# Patient Record
Sex: MALE | Race: WHITE | Employment: OTHER | URBAN - METROPOLITAN AREA
[De-identification: names, ages, dates, MRNs, and addresses within clinical notes are randomized per-mention and may not be internally consistent; named-entity substitution may affect disease eponyms.]

---

## 2020-09-02 ENCOUNTER — VIRTUAL VISIT (OUTPATIENT)
Dept: INTERNAL MEDICINE CLINIC | Age: 60
End: 2020-09-02
Payer: COMMERCIAL

## 2020-09-02 VITALS — SYSTOLIC BLOOD PRESSURE: 130 MMHG | DIASTOLIC BLOOD PRESSURE: 78 MMHG

## 2020-09-02 PROBLEM — N52.8 OTHER MALE ERECTILE DYSFUNCTION: Status: ACTIVE | Noted: 2020-09-02

## 2020-09-02 PROBLEM — I10 ESSENTIAL HYPERTENSION: Status: ACTIVE | Noted: 2020-09-02

## 2020-09-02 PROBLEM — F11.11 HISTORY OF OPIOID ABUSE (HCC): Status: ACTIVE | Noted: 2020-09-02

## 2020-09-02 PROBLEM — R35.1 BPH ASSOCIATED WITH NOCTURIA: Status: ACTIVE | Noted: 2020-09-02

## 2020-09-02 PROBLEM — N40.1 BPH ASSOCIATED WITH NOCTURIA: Status: ACTIVE | Noted: 2020-09-02

## 2020-09-02 PROCEDURE — 99203 OFFICE O/P NEW LOW 30 MIN: CPT | Performed by: NURSE PRACTITIONER

## 2020-09-02 PROCEDURE — 3017F COLORECTAL CA SCREEN DOC REV: CPT | Performed by: NURSE PRACTITIONER

## 2020-09-02 PROCEDURE — G8427 DOCREV CUR MEDS BY ELIG CLIN: HCPCS | Performed by: NURSE PRACTITIONER

## 2020-09-02 RX ORDER — LISINOPRIL 20 MG/1
20 TABLET ORAL DAILY
COMMUNITY
End: 2020-09-02 | Stop reason: SDUPTHER

## 2020-09-02 RX ORDER — SILDENAFIL 50 MG/1
50 TABLET, FILM COATED ORAL DAILY PRN
Qty: 10 TABLET | Refills: 2 | Status: SHIPPED | OUTPATIENT
Start: 2020-09-02 | End: 2020-11-06 | Stop reason: SDUPTHER

## 2020-09-02 RX ORDER — LISINOPRIL 20 MG/1
20 TABLET ORAL DAILY
Qty: 30 TABLET | Refills: 3 | Status: SHIPPED | OUTPATIENT
Start: 2020-09-02 | End: 2021-08-30

## 2020-09-02 RX ORDER — TAMSULOSIN HYDROCHLORIDE 0.4 MG/1
0.4 CAPSULE ORAL DAILY
Qty: 30 CAPSULE | Refills: 3 | Status: SHIPPED | OUTPATIENT
Start: 2020-09-02 | End: 2022-07-07 | Stop reason: ALTCHOICE

## 2020-09-02 ASSESSMENT — ENCOUNTER SYMPTOMS
BLOOD IN STOOL: 0
GASTROINTESTINAL NEGATIVE: 1
RESPIRATORY NEGATIVE: 1

## 2020-09-02 ASSESSMENT — PATIENT HEALTH QUESTIONNAIRE - PHQ9
1. LITTLE INTEREST OR PLEASURE IN DOING THINGS: 0
SUM OF ALL RESPONSES TO PHQ QUESTIONS 1-9: 0
SUM OF ALL RESPONSES TO PHQ QUESTIONS 1-9: 0
SUM OF ALL RESPONSES TO PHQ9 QUESTIONS 1 & 2: 0
2. FEELING DOWN, DEPRESSED OR HOPELESS: 0

## 2020-09-02 NOTE — PROGRESS NOTES
2020    TELEHEALTH EVALUATION -- Audio/Visual (During PKRSI-81 public health emergency)    HPI:    Sole Song (:  1960) has requested an audio/video evaluation for the following concern(s):    The patient presents to the office to establish with a new primary care provider. Last PCP was Dr. Francia Muniz about 7 years ago. He was recently released from prison where he was getting health care. He initially denied any chronic medical conditions but later admitted that he was being treated for high blood pressure while in prison. He has been taking Lasix and lisinopril \"as needed. \"    His main concern today is urinary frequency at night. He also reports weak urinary stream.  His symptoms have been present for about 5 months. There is no associated dysuria, hematuria, or hematospermia. He has history of erectile dysfunction for about 1 year. No past treatments. BP: 130/78    No tobacco use. No alcohol use. No current illicit drug abuse. Admits to history of pain medication and heroin abuse. Quit 3 years ago. Has remained sober. Review of Systems   Constitutional: Negative. HENT: Negative. Respiratory: Negative. Cardiovascular: Negative. Negative for chest pain. Gastrointestinal: Negative. Negative for blood in stool. Genitourinary: Positive for difficulty urinating and frequency. Negative for dysuria and hematuria. Musculoskeletal: Negative. Neurological: Negative. Psychiatric/Behavioral: Negative. All other systems reviewed and are negative. Prior to Visit Medications    Medication Sig Taking? Authorizing Provider   lisinopril (PRINIVIL;ZESTRIL) 20 MG tablet Take 20 mg by mouth daily Yes Historical Provider, MD       Social History     Tobacco Use    Smoking status: Never Smoker    Smokeless tobacco: Never Used   Substance Use Topics    Alcohol use: No     Comment: Never heavy user.     Drug use: No     Comment: Hx of MJ experimentation when young.            PHYSICAL EXAMINATION:  [ INSTRUCTIONS:  \"[x]\" Indicates a positive item  \"[]\" Indicates a negative item  -- DELETE ALL ITEMS NOT EXAMINED]  Vital Signs: (As obtained by patient/caregiver or practitioner observation)    Blood pressure-  Heart rate-    Respiratory rate-    Temperature-  Pulse oximetry-     Constitutional: [x] Appears well-developed and well-nourished [x] No apparent distress      [] Abnormal-   Mental status  [x] Alert and awake  [x] Oriented to person/place/time [x]Able to follow commands      Eyes:  EOM    [x]  Normal  [] Abnormal-  Sclera  [x]  Normal  [] Abnormal -         Discharge [x]  None visible  [] Abnormal -    HENT:   [x] Normocephalic, atraumatic. [] Abnormal   [x] Mouth/Throat: Mucous membranes are moist.     External Ears [x] Normal  [] Abnormal-     Neck: [x] No visualized mass     Pulmonary/Chest: [x] Respiratory effort normal.  [x] No visualized signs of difficulty breathing or respiratory distress        [] Abnormal-      Musculoskeletal:   [x] Normal gait with no signs of ataxia         [x] Normal range of motion of neck        [] Abnormal-       Neurological:        [x] No Facial Asymmetry (Cranial nerve 7 motor function) (limited exam to video visit)          [x] No gaze palsy        [] Abnormal-         Skin:        [x] No significant exanthematous lesions or discoloration noted on facial skin         [] Abnormal-            Psychiatric:       [x] Normal Affect [x] No Hallucinations        [] Abnormal-     Other pertinent observable physical exam findings-       ASSESSMENT/PLAN:  1. Encounter to establish care  -Discussed history and physical  -EMR information reviewed  -Patient will bring in his blood work from prison soon for review    2. Essential hypertension  -Reports he has been using lisinopril and Lasix as needed  -Encouraged him to take medication on a regular basis.  -He reports mostly controlled blood pressure.   We will continue lisinopril for now were located at their individual homes. --EDNA Lopez - CNP on 9/2/2020 at 11:50 AM    An electronic signature was used to authenticate this note.

## 2020-11-05 ENCOUNTER — TELEPHONE (OUTPATIENT)
Dept: INTERNAL MEDICINE CLINIC | Age: 60
End: 2020-11-05

## 2020-11-05 NOTE — TELEPHONE ENCOUNTER
Only seen once for NP visit virtually. He was supposed to bring in copies of blood work from alf, I do not see them. Did he bring them in? I can not treat testosterone without seeing him and checking labs.

## 2020-11-05 NOTE — TELEPHONE ENCOUNTER
----- Message from Rosy Butler sent at 11/5/2020  8:16 AM EST -----  Subject: Message to Provider    QUESTIONS  Information for Provider? Pt is requesting testosterone cream   to help with low levels   ---------------------------------------------------------------------------  --------------  CALL BACK INFO  What is the best way for the office to contact you? OK to leave message on   voicemail  Preferred Call Back Phone Number? [phone]  ---------------------------------------------------------------------------  --------------  SCRIPT ANSWERS  Relationship to Patient?  Self

## 2020-11-06 ENCOUNTER — OFFICE VISIT (OUTPATIENT)
Dept: INTERNAL MEDICINE CLINIC | Age: 60
End: 2020-11-06
Payer: COMMERCIAL

## 2020-11-06 VITALS
HEIGHT: 71 IN | SYSTOLIC BLOOD PRESSURE: 110 MMHG | HEART RATE: 46 BPM | TEMPERATURE: 98 F | WEIGHT: 308 LBS | DIASTOLIC BLOOD PRESSURE: 82 MMHG | BODY MASS INDEX: 43.12 KG/M2

## 2020-11-06 PROCEDURE — 90715 TDAP VACCINE 7 YRS/> IM: CPT | Performed by: NURSE PRACTITIONER

## 2020-11-06 PROCEDURE — G8484 FLU IMMUNIZE NO ADMIN: HCPCS | Performed by: NURSE PRACTITIONER

## 2020-11-06 PROCEDURE — 99396 PREV VISIT EST AGE 40-64: CPT | Performed by: NURSE PRACTITIONER

## 2020-11-06 RX ORDER — SILDENAFIL 50 MG/1
50 TABLET, FILM COATED ORAL DAILY PRN
Qty: 10 TABLET | Refills: 2 | Status: SHIPPED | OUTPATIENT
Start: 2020-11-06 | End: 2022-07-07 | Stop reason: SDUPTHER

## 2020-11-06 ASSESSMENT — ENCOUNTER SYMPTOMS
RESPIRATORY NEGATIVE: 1
GASTROINTESTINAL NEGATIVE: 1

## 2020-11-06 NOTE — PROGRESS NOTES
SUBJECTIVE:    Patient ID: Sandra Mosqueda is a 56 y.o. male. CC: Annual examination, testosterone concerns, erectile dysfunction, hypertension    HPI: The patient presents the office today for annual physical examination and follow-up of chronic medical conditions. He also has concerns about his health which are not new. Patient reports a history of low testosterone. He is interested in getting this treated but has not had a testosterone level checked recently. Low testosterone is manifesting and fatigue and intermittent libido, intermittent erectile dysfunction. At his new patient appointment which was conducted virtually, the patient requested assistance for erectile dysfunction. He was given a prescription for Viagra but was quoted $300 price by his pharmacy. He was unaware that he could go to [facility] and get the medication cheaper with good Rx. He has a history of hypertension. He denies any chest pain or shortness of breath. He is taking lisinopril as directed. He denies any side effects. Most recent health care was provided while he was incarcerated. He brings a disc with his labs on it but unfortunately due to security issues we are not able to access disks here in the office. He is agreeable to returning home with his disc and printing out the labs and returning them to the office at his next appointment. Patient discussed his history of opiate addiction. He reports a remote history of back injury and was placed on 5 mg Percocet by his back specialist.  He was on these medications for a number of years when they were increased to 10 mg Percocet tablets. He was again taking these medications for years when his provider told him that he can no longer provide him the medication. The patient states he almost immediately went into withdrawal and sought medication from the streets. He began using small amounts of heroin with escalated to larger amounts of heroin.   This then led to him committing crime to pay for his habit which is why he was incarcerated. He reports he has been clean from drug abuse for a number of years. He has mandatory drug counseling as part of his probation. Past Medical History:   Diagnosis Date    Back injury     2009 injury R upper back from fall    Benign essential hypertension      and LVH on echo    Dysmetabolic syndrome X     GERD (gastroesophageal reflux disease)     HDL lipoprotein deficiency     Hypercholesterolemia     Impotence     Low testosterone     Lumbar strain     Morbid obesity (HCC)     Opiate addiction ([facility] [address] 6/27/2011    2 to 3 percocet daily for four months    Syncope         Current Outpatient Medications   Medication Sig Dispense Refill    lisinopril (PRINIVIL;ZESTRIL) 20 MG tablet Take 1 tablet by mouth daily 30 tablet 3    tamsulosin (FLOMAX) 0.4 MG capsule Take 1 capsule by mouth daily 30 capsule 3    sildenafil (VIAGRA) 50 MG tablet Take 1 tablet by mouth daily as needed for Erectile Dysfunction (Patient not taking: Reported on 11/6/2020) 10 tablet 2     No current facility-administered medications for this visit. Review of Systems   Constitutional: Negative. Respiratory: Negative. Cardiovascular: Negative. Gastrointestinal: Negative. Endocrine:        Intermittent libido   Genitourinary:        Erectile dysfunction   Musculoskeletal: Negative. All other systems reviewed and are negative. OBJECTIVE:  Physical Exam  Vitals signs reviewed. Constitutional:       General: He is not in acute distress. Appearance: He is well-developed. He is not diaphoretic. HENT:      Head: Normocephalic and atraumatic. Eyes:      General: No scleral icterus. Conjunctiva/sclera: Conjunctivae normal.   Neck:      Musculoskeletal: Neck supple. Vascular: No JVD. Cardiovascular:      Rate and Rhythm: Normal rate and regular rhythm.    Pulmonary:      Effort: Pulmonary effort is normal. No respiratory distress. Breath sounds: Normal breath sounds. No wheezing or rales. Abdominal:      General: There is no distension. Palpations: Abdomen is soft. Tenderness: There is no abdominal tenderness. There is no guarding or rebound. Musculoskeletal: Normal range of motion. Skin:     General: Skin is warm and dry. Neurological:      Mental Status: He is alert and oriented to person, place, and time. Psychiatric:         Behavior: Behavior normal.         Thought Content: Thought content normal.        /82   Pulse (!) 46   Temp 98 °F (36.7 °C) (Temporal)   Ht 5' 11.1\" (1.806 m)   Wt (!) 308 lb (139.7 kg)   BMI 42.84 kg/m²      PHQ Scores 9/2/2020   PHQ2 Score 0   PHQ9 Score 0     Interpretation of Total Score Depression Severity: 1-4 = Minimal depression, 5-9 = Mild depression, 10-14 = Moderate depression, 15-19 = Moderately severe depression, 20-27 =Severe depression      ASSESSMENT/PLAN:  Betzaida was seen today for other. Diagnoses and all orders for this visit:    Annual physical exam  -     COMPREHENSIVE METABOLIC PANEL; Future  -     LIPID PANEL; Future    Hypotestosteronemia in male  -     Psa screening; Future  -     Testosterone; Future    Other male erectile dysfunction  -     COMPREHENSIVE METABOLIC PANEL; Future  -     sildenafil (VIAGRA) 50 MG tablet; Take 1 tablet by mouth daily as needed for Erectile Dysfunction  -     Hemoglobin A1C; Future  -     Psa screening; Future  -     Testosterone; Future    Essential hypertension  -     COMPREHENSIVE METABOLIC PANEL; Future  -     LIPID PANEL; Future    BPH associated with nocturia  -     COMPREHENSIVE METABOLIC PANEL; Future  -     Psa screening; Future    Screening for HIV (human immunodeficiency virus)  -     HIV Screen; Future    Need for hepatitis C screening test  -     Hepatitis C Antibody;  Future    Diabetes mellitus screening  -     Hemoglobin A1C; Future    Need for tetanus booster  -     Tdap (age 6y and older) IM ([address])    History of opioid abuse ([facility] [address]  -Discussed his history of opiate abuse and how this happened and how he was incarcerated, see HPI    He declines flu vaccination. He declines shingles vaccination.         Dayana Khanna, APRN - CNP

## 2020-11-10 LAB
CONTROL: NORMAL
HEMOCCULT STL QL: NEGATIVE

## 2020-11-10 PROCEDURE — 82274 ASSAY TEST FOR BLOOD FECAL: CPT | Performed by: NURSE PRACTITIONER

## 2020-11-11 DIAGNOSIS — Z00.00 ANNUAL PHYSICAL EXAM: ICD-10-CM

## 2020-11-11 DIAGNOSIS — R35.1 BPH ASSOCIATED WITH NOCTURIA: ICD-10-CM

## 2020-11-11 DIAGNOSIS — N40.1 BPH ASSOCIATED WITH NOCTURIA: ICD-10-CM

## 2020-11-11 DIAGNOSIS — E29.1 HYPOTESTOSTERONEMIA IN MALE: ICD-10-CM

## 2020-11-11 DIAGNOSIS — Z11.59 NEED FOR HEPATITIS C SCREENING TEST: ICD-10-CM

## 2020-11-11 DIAGNOSIS — Z13.1 DIABETES MELLITUS SCREENING: ICD-10-CM

## 2020-11-11 DIAGNOSIS — I10 ESSENTIAL HYPERTENSION: ICD-10-CM

## 2020-11-11 DIAGNOSIS — Z11.4 SCREENING FOR HIV (HUMAN IMMUNODEFICIENCY VIRUS): ICD-10-CM

## 2020-11-11 DIAGNOSIS — N52.8 OTHER MALE ERECTILE DYSFUNCTION: ICD-10-CM

## 2020-11-11 LAB
A/G RATIO: 1.4 (ref 1.1–2.2)
ALBUMIN SERPL-MCNC: 4.1 G/DL (ref 3.4–5)
ALP BLD-CCNC: 55 U/L (ref 40–129)
ALT SERPL-CCNC: 10 U/L (ref 10–40)
ANION GAP SERPL CALCULATED.3IONS-SCNC: 12 MMOL/L (ref 3–16)
AST SERPL-CCNC: 13 U/L (ref 15–37)
BILIRUB SERPL-MCNC: 0.6 MG/DL (ref 0–1)
BUN BLDV-MCNC: 23 MG/DL (ref 7–20)
CALCIUM SERPL-MCNC: 9.3 MG/DL (ref 8.3–10.6)
CHLORIDE BLD-SCNC: 105 MMOL/L (ref 99–110)
CHOLESTEROL, TOTAL: 152 MG/DL (ref 0–199)
CO2: 26 MMOL/L (ref 21–32)
CREAT SERPL-MCNC: 0.8 MG/DL (ref 0.8–1.3)
GFR AFRICAN AMERICAN: >60
GFR NON-AFRICAN AMERICAN: >60
GLOBULIN: 2.9 G/DL
GLUCOSE BLD-MCNC: 96 MG/DL (ref 70–99)
HDLC SERPL-MCNC: 38 MG/DL (ref 40–60)
LDL CHOLESTEROL CALCULATED: 92 MG/DL
POTASSIUM SERPL-SCNC: 4.9 MMOL/L (ref 3.5–5.1)
PROSTATE SPECIFIC ANTIGEN: 0.91 NG/ML (ref 0–4)
SODIUM BLD-SCNC: 143 MMOL/L (ref 136–145)
TOTAL PROTEIN: 7 G/DL (ref 6.4–8.2)
TRIGL SERPL-MCNC: 110 MG/DL (ref 0–150)
VLDLC SERPL CALC-MCNC: 22 MG/DL

## 2020-11-12 LAB
ESTIMATED AVERAGE GLUCOSE: 111.2 MG/DL
HBA1C MFR BLD: 5.5 %
HEPATITIS C ANTIBODY INTERPRETATION: REACTIVE
HIV AG/AB: NORMAL
HIV ANTIGEN: NORMAL
HIV-1 ANTIBODY: NORMAL
HIV-2 AB: NORMAL

## 2020-11-13 LAB — TESTOSTERONE TOTAL: 298 NG/DL (ref 220–1000)

## 2020-11-17 ENCOUNTER — OFFICE VISIT (OUTPATIENT)
Dept: INTERNAL MEDICINE CLINIC | Age: 60
End: 2020-11-17
Payer: COMMERCIAL

## 2020-11-17 ENCOUNTER — TELEPHONE (OUTPATIENT)
Dept: INTERNAL MEDICINE CLINIC | Age: 60
End: 2020-11-17

## 2020-11-17 VITALS
HEART RATE: 43 BPM | TEMPERATURE: 96.1 F | HEIGHT: 73 IN | SYSTOLIC BLOOD PRESSURE: 124 MMHG | DIASTOLIC BLOOD PRESSURE: 72 MMHG | BODY MASS INDEX: 40.82 KG/M2 | WEIGHT: 308 LBS

## 2020-11-17 PROCEDURE — G8417 CALC BMI ABV UP PARAM F/U: HCPCS | Performed by: NURSE PRACTITIONER

## 2020-11-17 PROCEDURE — 99213 OFFICE O/P EST LOW 20 MIN: CPT | Performed by: NURSE PRACTITIONER

## 2020-11-17 PROCEDURE — 3017F COLORECTAL CA SCREEN DOC REV: CPT | Performed by: NURSE PRACTITIONER

## 2020-11-17 PROCEDURE — G8484 FLU IMMUNIZE NO ADMIN: HCPCS | Performed by: NURSE PRACTITIONER

## 2020-11-17 PROCEDURE — 1036F TOBACCO NON-USER: CPT | Performed by: NURSE PRACTITIONER

## 2020-11-17 PROCEDURE — G8427 DOCREV CUR MEDS BY ELIG CLIN: HCPCS | Performed by: NURSE PRACTITIONER

## 2020-11-17 NOTE — TELEPHONE ENCOUNTER
Returned call, no answer. Left message to call back. It's about his labs. When he calls back, please give call to me.

## 2020-11-17 NOTE — PROGRESS NOTES
PHQ Scores 9/2/2020   PHQ2 Score 0   PHQ9 Score 0     Interpretation of Total Score Depression Severity: 1-4 = Minimal depression, 5-9 = Mild depression, 10-14 = Moderate depression, 15-19 = Moderately severe depression, 20-27 =Severe depression        ASSESSMENT/PLAN:  Betzaida was seen today for skin problem.     Diagnoses and all orders for this visit:    Sebaceous cyst  -     Queen Kylah MD, General Surgery, [facility]    Curvature of the penis  -     [facility] - Randell Ni MD, [facility], [facility]    Other male erectile dysfunction  -     [facility] - Randell Ni MD, [facility], [facility]        EDNA Bains - CNP

## 2020-11-17 NOTE — TELEPHONE ENCOUNTER
----- Message from Vicky Felder sent at 11/17/2020  3:08 PM EST -----  Subject: Message to Provider    QUESTIONS  Information for Provider? Pt was referred to  but he does not   take his insurance so he would need a new referral to another urologist.  ---------------------------------------------------------------------------  --------------  [address]  What is the best way for the office to contact you? OK to leave message on   voicemail  Preferred Call Back Phone Number? [phone]  ---------------------------------------------------------------------------  --------------  SCRIPT ANSWERS  Relationship to Patient?  Self

## 2020-11-17 NOTE — TELEPHONE ENCOUNTER
The only urologist I know of in the area are with the urology group. He will need to contact his insurance company and see who is covered. It is possible he would need to go to  or [facility] to the [place] area. The referral I gave him a work in any urology office.

## 2020-11-20 ENCOUNTER — TELEPHONE (OUTPATIENT)
Dept: INTERNAL MEDICINE CLINIC | Age: 60
End: 2020-11-20

## 2020-11-20 LAB — TESTOSTERONE TOTAL: 343 NG/DL (ref 220–1000)

## 2020-11-20 NOTE — TELEPHONE ENCOUNTER
----- Message from Rheaadrianaj VazquezDarwin sent at 11/20/2020 12:11 PM EST -----  Subject: Message to Provider    QUESTIONS  Information for Provider? PT wanted to know can he check his blood work   and see can he start on his testosterone program and if so can he get it   sent to sallie across the street from the office   ---------------------------------------------------------------------------  --------------  [address]  What is the best way for the office to contact you? OK to leave message on   voicemail  Preferred Call Back Phone Number? [phone]  ---------------------------------------------------------------------------  --------------  SCRIPT ANSWERS  Relationship to Patient?  Self

## 2020-11-22 LAB
HCV QNT BY NAAT IU/ML: NOT DETECTED IU/ML
HCV QNT BY NAAT LOG IU/ML: NOT DETECTED LOG IU/ML
INTERPRETATION: NOT DETECTED

## 2020-12-01 ENCOUNTER — OFFICE VISIT (OUTPATIENT)
Dept: SURGERY | Age: 60
End: 2020-12-01
Payer: COMMERCIAL

## 2020-12-01 VITALS
WEIGHT: 311.2 LBS | TEMPERATURE: 97.1 F | BODY MASS INDEX: 41.06 KG/M2 | DIASTOLIC BLOOD PRESSURE: 60 MMHG | SYSTOLIC BLOOD PRESSURE: 122 MMHG

## 2020-12-01 PROCEDURE — G8427 DOCREV CUR MEDS BY ELIG CLIN: HCPCS | Performed by: SURGERY

## 2020-12-01 PROCEDURE — G8484 FLU IMMUNIZE NO ADMIN: HCPCS | Performed by: SURGERY

## 2020-12-01 PROCEDURE — G8417 CALC BMI ABV UP PARAM F/U: HCPCS | Performed by: SURGERY

## 2020-12-01 PROCEDURE — 99242 OFF/OP CONSLTJ NEW/EST SF 20: CPT | Performed by: SURGERY

## 2020-12-01 ASSESSMENT — ENCOUNTER SYMPTOMS
EYES NEGATIVE: 1
GASTROINTESTINAL NEGATIVE: 1
RESPIRATORY NEGATIVE: 1
ALLERGIC/IMMUNOLOGIC NEGATIVE: 1

## 2020-12-01 NOTE — PROGRESS NOTES
44    Cancer Maternal Grandfather     Alzheimer's Disease Maternal Grandmother     Cervical Cancer Neg Hx        REVIEW OF SYSTEMS:  Review of Systems   Constitutional: Negative. HENT: Negative. Eyes: Negative. Respiratory: Negative. Cardiovascular: Negative. Gastrointestinal: Negative. Endocrine: Negative. Genitourinary: Negative. Musculoskeletal: Positive for neck pain and neck stiffness. Skin: Negative. Allergic/Immunologic: Negative. Neurological: Negative. Hematological: Negative. Psychiatric/Behavioral: Negative. PHYSICAL EXAM:  VITALS:  Temp 97.1 °F (36.2 °C)   Wt (!) 311 lb 3.2 oz (141.2 kg)   BMI 41.06 kg/m²   CONSTITUTIONAL:  alert, no apparent distress and morbidly obese  EYES:  sclera clear  ENT:  normocepalic, without obvious abnormality  NECK:  supple, symmetrical, trachea midline  LUNGS:  clear to auscultation  CARDIOVASCULAR:  regular rate and rhythm  NEUROLOGIC:  Mental Status Exam:  Level of Alertness:   awake  Orientation:   person, place, time  SKIN:  Posterior lower neck cyst, 3 cm, towards left side        Radiology Review:   None    IMPRESSION/RECOMMENDATIONS:    Posterior neck cyst    Symptomatic lesion  Will plan office excision under local  The problem and plan were discussed in detail with the patient today. All questions have been answered, and they agree to proceed.     Thank you,    Alice Sawyer

## 2020-12-10 ENCOUNTER — PROCEDURE VISIT (OUTPATIENT)
Dept: SURGERY | Age: 60
End: 2020-12-10
Payer: COMMERCIAL

## 2020-12-10 VITALS — BODY MASS INDEX: 41.03 KG/M2 | TEMPERATURE: 97 F | WEIGHT: 311 LBS

## 2020-12-10 PROCEDURE — 11423 EXC H-F-NK-SP B9+MARG 2.1-3: CPT | Performed by: SURGERY

## 2020-12-10 NOTE — PROGRESS NOTES
[facility] GENERAL AND LAPAROSCOPIC SURGERY          PATIENT NAME: Cordell Antonio     TODAY'S DATE: 12/10/2020    SUBJECTIVE:    Pt here for neck excision.   Large tender posterior neck cyst     OBJECTIVE:  VITALS:  Temp 97 °F (36.1 °C)   Wt (!) 311 lb (141.1 kg)   BMI 41.03 kg/m²     Procedure Note:  Left posterior neck area epidermal cyst  Site confirmed, pt consents to excision      Betadine Prep  1% Lido with epi X 10 ml local  #15 blade excision done  3.0 cm lesion excised, skin and SQ, and entire cyst wall  Cleaned with NS  Closed with 3-0 Vicryl  Pt tolerated well  Site hemostatic  DSD placed/ Skinaffix glue placed  Dressing care instructions reviewed with pt    Cyndi Mendez

## 2020-12-11 ENCOUNTER — OFFICE VISIT (OUTPATIENT)
Dept: INTERNAL MEDICINE CLINIC | Age: 60
End: 2020-12-11
Payer: COMMERCIAL

## 2020-12-11 VITALS
WEIGHT: 311 LBS | BODY MASS INDEX: 41.03 KG/M2 | DIASTOLIC BLOOD PRESSURE: 66 MMHG | TEMPERATURE: 96.7 F | HEART RATE: 44 BPM | SYSTOLIC BLOOD PRESSURE: 138 MMHG

## 2020-12-11 PROCEDURE — 17110 DESTRUCTION B9 LES UP TO 14: CPT | Performed by: NURSE PRACTITIONER

## 2020-12-11 PROCEDURE — G8484 FLU IMMUNIZE NO ADMIN: HCPCS | Performed by: NURSE PRACTITIONER

## 2020-12-11 PROCEDURE — 3017F COLORECTAL CA SCREEN DOC REV: CPT | Performed by: NURSE PRACTITIONER

## 2020-12-11 PROCEDURE — G8417 CALC BMI ABV UP PARAM F/U: HCPCS | Performed by: NURSE PRACTITIONER

## 2020-12-11 PROCEDURE — 99214 OFFICE O/P EST MOD 30 MIN: CPT | Performed by: NURSE PRACTITIONER

## 2020-12-11 PROCEDURE — G8427 DOCREV CUR MEDS BY ELIG CLIN: HCPCS | Performed by: NURSE PRACTITIONER

## 2020-12-11 PROCEDURE — 1036F TOBACCO NON-USER: CPT | Performed by: NURSE PRACTITIONER

## 2020-12-15 ASSESSMENT — ENCOUNTER SYMPTOMS
RESPIRATORY NEGATIVE: 1
GASTROINTESTINAL NEGATIVE: 1
ROS SKIN COMMENTS: WART

## 2020-12-15 NOTE — PROGRESS NOTES
SUBJECTIVE:    Patient ID: Michele Reynolds is a 56 y.o. male. CC: Hypertension, BPH, erectile dysfunction, wart    HPI: The patient presents the office today for follow-up of chronic medical conditions. He also has a wart on the end of his finger which is bothersome would like to have removed. On the end of his middle finger left hand, the patient has developed a wart. Due to the location, this will catch on things and is bothersome to him. He is requesting it be removed. He has a history of hypertension. He denies any chest pain or shortness of breath. He is compliant with his medication regimen. He has a history of erectile dysfunction. Most recent testosterone levels were low normal.  He uses Viagra as needed. BPH: He has a history of BPH. He uses tamsulosin daily. Past Medical History:   Diagnosis Date    Back injury     2009 injury R upper back from fall    Benign essential hypertension      and LVH on echo    Dysmetabolic syndrome X     GERD (gastroesophageal reflux disease)     HDL lipoprotein deficiency     Hypercholesterolemia     Impotence     Low testosterone     Lumbar strain     Morbid obesity (HCC)     Opiate addiction ([facility] [address] 6/27/2011    2 to 3 percocet daily for four months    Syncope         Current Outpatient Medications   Medication Sig Dispense Refill    sildenafil (VIAGRA) 50 MG tablet Take 1 tablet by mouth daily as needed for Erectile Dysfunction 10 tablet 2    lisinopril (PRINIVIL;ZESTRIL) 20 MG tablet Take 1 tablet by mouth daily 30 tablet 3    tamsulosin (FLOMAX) 0.4 MG capsule Take 1 capsule by mouth daily 30 capsule 3     No current facility-administered medications for this visit. Review of Systems   Constitutional: Negative. Respiratory: Negative. Cardiovascular: Negative. Gastrointestinal: Negative. Genitourinary: Negative. Musculoskeletal: Negative. Skin:        wart   Neurological: Negative. Psychiatric/Behavioral: Negative. All other systems reviewed and are negative. OBJECTIVE:  Physical Exam  Vitals signs reviewed. Constitutional:       General: He is not in acute distress. Appearance: He is well-developed. He is not diaphoretic. HENT:      Head: Normocephalic and atraumatic. Eyes:      General: No scleral icterus. Conjunctiva/sclera: Conjunctivae normal.   Neck:      Musculoskeletal: Neck supple. Vascular: No JVD. Cardiovascular:      Rate and Rhythm: Normal rate and regular rhythm. Pulmonary:      Effort: Pulmonary effort is normal. No respiratory distress. Breath sounds: Normal breath sounds. No wheezing or rales. Abdominal:      General: There is no distension. Palpations: Abdomen is soft. Tenderness: There is no abdominal tenderness. There is no guarding or rebound. Musculoskeletal: Normal range of motion. Skin:     General: Skin is warm and dry. Comments: On the distal tip of the left middle finger there is a raised, seedy appearing growth consistent with a wart. Neurological:      Mental Status: He is alert and oriented to person, place, and time. Psychiatric:         Behavior: Behavior normal.         Thought Content: Thought content normal.        /66   Pulse (!) 44   Temp 96.7 °F (35.9 °C) (Temporal)   Wt (!) 311 lb (141.1 kg)   BMI 41.03 kg/m²      PHQ Scores 9/2/2020   PHQ2 Score 0   PHQ9 Score 0     Interpretation of Total Score Depression Severity: 1-4 = Minimal depression, 5-9 = Mild depression, 10-14 = Moderate depression, 15-19 = Moderately severe depression, 20-27 =Severe depression      ASSESSMENT/PLAN:  Betzaida was seen today for 3 month follow-up.   Diagnoses and all orders for this visit:    Common wart - The area was cleaned with alcohol and allowed to dry. Following 's instructions, cryotherapy was applied to the wart. The patient tolerated the procedure well and there were no complications. Post care instructions were provided. -     [address] - [place] DESTRUCTION BENIGN LESIONS UP TO 14    Essential hypertension  - Normotensive  - he has met JNC standards.  - Continue current regimen.     BPH associated with nocturia  -Chronic  -Continue Flomax    Other male erectile dysfunction  -Chronic  -Continue Lisaa      EDNA Ibrahim - CNP

## 2021-04-29 ENCOUNTER — TELEPHONE (OUTPATIENT)
Dept: INTERNAL MEDICINE CLINIC | Age: 61
End: 2021-04-29

## 2021-04-29 NOTE — TELEPHONE ENCOUNTER
Called pt  -- advised can offer appt but with only 3 days of symptoms an ATB would not be indicated     Pt will use OTC meds and call if no better or worsens

## 2021-04-29 NOTE — TELEPHONE ENCOUNTER
----- Message from Ghada Russ sent at 4/29/2021  8:33 AM EDT -----  Subject: Message to Provider    QUESTIONS  Information for Provider? Nose stuffy, Sore throat lasting about 2-3 days   ---------------------------------------------------------------------------  --------------  CALL BACK INFO  What is the best way for the office to contact you? OK to leave message on   voicemail  Preferred Call Back Phone Number? [phone]  ---------------------------------------------------------------------------  --------------  SCRIPT ANSWERS  Relationship to Patient? Self  Appointment reason? Symptomatic  Select script based on patient symptoms? Adult Cough/Cold Symptoms [Runny   Nose, Sore Throat, Flu, Sinus, Sinus Infection, Upper Respiratory   Infection [URI], Congestion]   Are you currently unable to finish sentences due to any difficulty   breathing? No  Are you unable to swallow liquids? No  Are you having fevers (100.4 or greater), chills, or sweats? No  Do you have COPD, asthma or a chronic lung condition? No  Have your symptoms been present for more than 5 days?  No

## 2022-05-20 DIAGNOSIS — I10 ESSENTIAL HYPERTENSION: ICD-10-CM

## 2022-05-20 RX ORDER — LISINOPRIL 20 MG/1
TABLET ORAL
Qty: 30 TABLET | Refills: 2 | OUTPATIENT
Start: 2022-05-20

## 2022-05-27 ENCOUNTER — TELEPHONE (OUTPATIENT)
Dept: INTERNAL MEDICINE CLINIC | Age: 62
End: 2022-05-27

## 2022-05-27 NOTE — TELEPHONE ENCOUNTER
Pt wanting amoxicillin for his cold symptoms. In no acute distress. Last seen 12/2020. No openings for pt with any provider. Tried to give him an appt for Tuesday. Aware has option of urgent care. Called pt back for symptoms--it rings, no answer.

## 2022-05-27 NOTE — TELEPHONE ENCOUNTER
----- Message from Bart Germain sent at 5/27/2022  2:11 PM EDT -----  Subject: Appointment Request    Reason for Call: Semi-Routine Cough, Cold Symptoms    QUESTIONS  Type of Appointment? Established Patient  Reason for appointment request? Available appointments did not meet   patient need  Additional Information for Provider? Patient would like to be seen today   for an appointment. He is experiencing cold symptoms. ---------------------------------------------------------------------------  --------------  Yudith NASH  What is the best way for the office to contact you? OK to leave message on   voicemail  Preferred Call Back Phone Number? [phone]  ---------------------------------------------------------------------------  --------------  SCRIPT ANSWERS  Relationship to Patient? Self  Are you currently unable to finish sentences due to any difficulty   breathing? No  Are you unable to swallow liquids? No  Are you having fevers (100.4 or greater), chills, or sweats? No  Do you have COPD, asthma or a chronic lung condition? No  Have your symptoms been present for more than 5 days? No  Have you recently (14 days) been seen by a provider for this issue? No  Have you been diagnosed with, awaiting test results for, or told that you   are suspected of having COVID-19 (Coronavirus)? (If patient has tested   negative or was tested as a requirement for work, school, or travel and   not based on symptoms, answer no)? No  Within the past 10 days have you developed any of the following symptoms   (answer no if symptoms have been present longer than 10 days or began   more than 10 days ago)? Fever or Chills, Cough, Shortness of breath or   difficulty breathing, Loss of taste or smell, Sore throat, Nasal   congestion, Sneezing or runny nose, Fatigue or generalized body aches   (answer no if pain is specific to a body part e.g. back pain), Diarrhea,   Headache?  No  Have you had close contact with someone with COVID-19 in the last 7 days? No  (Service Expert  click yes below to proceed with [employer] As Usual   Scheduling)?  Yes

## 2022-05-27 NOTE — TELEPHONE ENCOUNTER
Pt states symptoms of headache and sorethroat a couple of days. Afebrile. Tylenol helps the HA. He states he will go to urgent care --can test for reasons why sore throat.

## 2022-06-15 ENCOUNTER — TELEMEDICINE (OUTPATIENT)
Dept: INTERNAL MEDICINE CLINIC | Age: 62
End: 2022-06-15
Payer: COMMERCIAL

## 2022-06-15 DIAGNOSIS — R05.9 COUGH: Primary | ICD-10-CM

## 2022-06-15 DIAGNOSIS — R06.2 WHEEZING: ICD-10-CM

## 2022-06-15 PROCEDURE — G8427 DOCREV CUR MEDS BY ELIG CLIN: HCPCS | Performed by: NURSE PRACTITIONER

## 2022-06-15 PROCEDURE — 1036F TOBACCO NON-USER: CPT | Performed by: NURSE PRACTITIONER

## 2022-06-15 PROCEDURE — 99213 OFFICE O/P EST LOW 20 MIN: CPT | Performed by: NURSE PRACTITIONER

## 2022-06-15 PROCEDURE — G8421 BMI NOT CALCULATED: HCPCS | Performed by: NURSE PRACTITIONER

## 2022-06-15 PROCEDURE — 3017F COLORECTAL CA SCREEN DOC REV: CPT | Performed by: NURSE PRACTITIONER

## 2022-06-15 RX ORDER — ALBUTEROL SULFATE 90 UG/1
AEROSOL, METERED RESPIRATORY (INHALATION)
COMMUNITY

## 2022-06-15 RX ORDER — BENZONATATE 100 MG/1
100 CAPSULE ORAL 3 TIMES DAILY PRN
Qty: 30 CAPSULE | Refills: 0 | Status: SHIPPED | OUTPATIENT
Start: 2022-06-15 | End: 2022-06-22

## 2022-06-15 ASSESSMENT — PATIENT HEALTH QUESTIONNAIRE - PHQ9
SUM OF ALL RESPONSES TO PHQ QUESTIONS 1-9: 0
SUM OF ALL RESPONSES TO PHQ QUESTIONS 1-9: 0
1. LITTLE INTEREST OR PLEASURE IN DOING THINGS: 0
SUM OF ALL RESPONSES TO PHQ9 QUESTIONS 1 & 2: 0
SUM OF ALL RESPONSES TO PHQ QUESTIONS 1-9: 0
SUM OF ALL RESPONSES TO PHQ QUESTIONS 1-9: 0
2. FEELING DOWN, DEPRESSED OR HOPELESS: 0

## 2022-06-15 ASSESSMENT — ENCOUNTER SYMPTOMS
SINUS PRESSURE: 0
DIARRHEA: 0
SHORTNESS OF BREATH: 0
CONSTIPATION: 0
COUGH: 1
TROUBLE SWALLOWING: 0
SINUS PAIN: 0
WHEEZING: 1
SORE THROAT: 0
EYE PAIN: 0
RHINORRHEA: 0
VOMITING: 0
NAUSEA: 0
ABDOMINAL PAIN: 0

## 2022-06-15 NOTE — PROGRESS NOTES
TELEHEALTH EVALUATION -- Audio/Visual (During AKNNP-12 public health emergency)  Acute Office Visit  6/15/2022    SUBJECTIVE:    Patient ID: rEmias Zhang is a 57 y.o. male. Chief Complaint   Patient presents with    Other     Moist non-productive cough-unable to cough up anything, wheezing. Recent ATB for resp infection from Urgent Care visit-sypmtoms some better but wheezing remains. Symptoms started 2 weeks ago. No SOB/chest pain. No reported fever. HPI: The patient presents for an acute visit. Pt reports a nonproductive cough and wheezing for the last few weeks. States he went to Urgent Care and was prescribed an ATB (Z-messi) and symptoms improved. Now, states he still has some wheezing and \"still cough occasionally. \" overall, pt states \"I feel great. \"  Staying hydrated per pt. Denies asthma or COPD history. Denies fevers or chills. Denies N/V, diarrhea or abdominal pain. Denies CP, SOB or wheezing. Treatment tried and response - ATB and cough syrup   Not vaccinated for COVID-19- pt states: \"that will never happen. \"  Recent ill contacts? no  Tobacco use or second hand smoke exposure - no  Condition better, worsening, or stable - improving    No Known Allergies     Current Outpatient Medications   Medication Sig Dispense Refill    albuterol sulfate HFA (VENTOLIN HFA) 108 (90 Base) MCG/ACT inhaler Ventolin HFA 90 mcg/actuation aerosol inhaler   INHALE 2 PUFFS BY MOUTH EVERY 4 HOURS      benzonatate (TESSALON) 100 MG capsule Take 1 capsule by mouth 3 times daily as needed for Cough 30 capsule 0    lisinopril (PRINIVIL;ZESTRIL) 20 MG tablet TAKE 1 TABLET BY MOUTH EVERY DAY 30 tablet 2    sildenafil (VIAGRA) 50 MG tablet Take 1 tablet by mouth daily as needed for Erectile Dysfunction (Patient not taking: Reported on 6/15/2022) 10 tablet 2    tamsulosin (FLOMAX) 0.4 MG capsule Take 1 capsule by mouth daily (Patient not taking: Reported on 6/15/2022) 30 capsule 3     No current facility-administered medications for this visit. Review of Systems   Constitutional: Negative for appetite change, chills, diaphoresis, fatigue and fever. HENT: Negative for congestion, drooling, ear discharge, ear pain, hearing loss, postnasal drip, rhinorrhea, sinus pressure, sinus pain, sneezing, sore throat and trouble swallowing. Eyes: Negative for pain and visual disturbance. Respiratory: Positive for cough and wheezing. Negative for shortness of breath. Cardiovascular: Negative for chest pain and palpitations. Gastrointestinal: Negative for abdominal pain, constipation, diarrhea, nausea and vomiting. Skin: Negative for pallor. Neurological: Negative for dizziness, light-headedness and headaches. OBJECTIVE:  Video Virtual Visit  Patient-Reported Vitals 6/15/2022   Patient-Reported Weight 340   Patient-Reported Height 73 in   Patient-Reported Systolic (No Data)   Patient-Reported Diastolic (No Data)   Patient-Reported Pulse (No Data)   Patient-Reported Temperature (No Data)    ^ no access to other VS d/t VV per pt  Physical Exam  Constitutional:       General: He is not in acute distress. Appearance: Normal appearance. He is not ill-appearing. Pulmonary:      Effort: Pulmonary effort is normal. No respiratory distress. Skin:     Coloration: Skin is not jaundiced or pale. Neurological:      Mental Status: He is alert. Comments: No facial asymmetry noted   Psychiatric:         Mood and Affect: Mood normal.     Due to this being a TeleHealth encounter, evaluation of the following organ systems is limited: Vitals/Constitutional/EENT/Resp/CV/GI//MS/Neuro/Skin/Heme-Lymph-Imm. ASSESSMENT/PLAN:  Betzaida was seen today for other. Diagnoses and all orders for this visit:    Cough/Wheezing   - Improving but not completely resolved. - Recommend COVID-19 testing. Pt declines. Risks vs benefits reviewed.    - Reviewed length of symptom resolution with the patient and he states he would like a medication for cough. He denies a chest x-ray at this time. - Symptomatic management reviewed. -     benzonatate (TESSALON) 100 MG capsule; Take 1 capsule by mouth 3 times daily as needed for Cough - patient education handout provided and reviewed with the pt. - Pt will call if symptoms worsen or fail to improve  - Red flag warning signs reviewed with the pt and he will go to the ER if these occur. Return for as previously scheduled or sooner if needed. Betzaida Clayton, was evaluated through a synchronous (real-time) audio-video encounter. The patient (or guardian if applicable) is aware that this is a billable service, which includes applicable co-pays. This Virtual Visit was conducted with patient's (and/or legal guardian's) consent. The visit was conducted pursuant to the emergency declaration under the [address] [place] [address], [address] [place] [address] authority and the [employer] General Act. Patient identification was verified, and a caregiver was present when appropriate. The patient was located in a state where the provider was licensed to provide care. Consent:  He and/or health care decision maker is aware that that he may receive a bill for this virtual service, depending on his insurance coverage, and has provided verbal consent to proceed: Yes    Patient identification was verified at the start of the visit: Yes    Total time spent on this encounter: Not billed by time    Services were provided through a video synchronous discussion virtually to substitute for in-person clinic visit. Patient and provider were located at their individual homes. All questions answered. Pt states no further questions or concerns at this time.    Electronically signed by: EDNA Jaffe CNP 06/15/22

## 2022-06-15 NOTE — PATIENT INSTRUCTIONS
Patient Education   benzonatate  Pronunciation: felipa aponte  Brand: Tessalon Perles  What is the most important information I should know about benzonatate? Never suck or chew on a benzonatate capsule. Swallow the pill whole. Sucking or chewing the capsule may cause serious sideeffects. Benzonatate is not approved for use by anyone younger than 8years old. An overdose of benzonatate can be fatal to a young child. What is benzonatate? Benzonatate is used to relieve coughing. Benzonatate is a non-narcotic cough medicine that numbs the throat and lungs,making the cough reflex less active. Benzonatate may also be used for purposes not listed in this medication guide. What should I discuss with my healthcare provider before taking benzonatate? You should not use this medicine if you are allergic to benzonatate or topical numbing medicines such as tetracaine or procaine (found in some insect bite andsunburn creams). Tell your doctor if you are pregnant or breastfeeding. Benzonatate is not approved for use by anyone younger than 8years old. An overdose of benzonatate can be fatal, especially to a young child who has accidentally swallowed the medicine. How should I take benzonatate? Follow all directions on your prescription label and read all medication guidesor instruction sheets. Use the medicine exactly as directed. Never suck or chew on a benzonatate capsule. Swallow the pill whole. Sucking or chewing the capsule may cause serious sideeffects. Store at room temperature away from moisture, heat, and light. What happens if I miss a dose? Skip the missed dose and use your next dose at the regular time. Do not use two doses at one time. What happens if I overdose? Seek emergency medical attention or call the Poison Help line at 1-978.832.3224. An overdose of benzonatate can be fatal, especially to a child. Accidental death has occurred in children under 8years old.   Overdose symptoms may include tremors, feeling restless, seizure (convulsions),slow heart rate, weak pulse, fainting, and slow breathing (breathing may stop). What should I avoid while taking benzonatate? Avoid eating or drinking anything while you feel numbness or tingling in yourmouth or throat. What are the possible side effects of benzonatate? Stop taking this medicine and get emergency medical help if you have signs of an allergic reaction: hives; difficult breathing; swelling of your face, lips, tongue, or throat. Call your doctor at once if you have:   severe drowsiness or dizziness;   confusion, hallucinations.  ongoing numbness or tingling in your mouth, throat, or face;   numbness in your chest;   a choking feeling;   chills; or   burning in your eyes. Some of these side effects may result from chewing or sucking on a benzonatate capsule. Common side effects may include:   headache, dizziness;   nausea, upset stomach;   constipation;   itching, rash; or   stuffy nose. This is not a complete list of side effects and others may occur. Call your doctor for medical advice about side effects. You may report side effects toFDA at 3-971-JLL-4693. What other drugs will affect benzonatate? Using benzonatate with other drugs that make you drowsy can worsen this effect. Ask your doctor before using opioid medication, a sleeping pill, a musclerelaxer, or medicine for anxiety or seizures. Other drugs may affect benzonatate, including prescription and over-the-counter medicines, vitamins, and herbal products. Tell your doctor about all yourcurrent medicines and any medicine you start or stop using. Where can I get more information? Your pharmacist can provide more information about benzonatate. Remember, keep this and all other medicines out of the reach of children, never share your medicines with others, and use this medication only for the indication prescribed.    Every effort has been made to ensure that the information provided by [address] is accurate, up-to-date, and complete, but no guarantee is made to that effect. Drug information contained herein may be time sensitive. [facility] information has been compiled for use by healthcare practitioners and consumers in the [place] and therefore [facility] does not warrant that uses outside of the [place] are appropriate, unless specifically indicated otherwise. [facility]'s drug information does not endorse drugs, diagnose patients or recommend therapy. [facility]'s drug information is an informational resource designed to assist licensed healthcare practitioners in caring for their patients and/or to serve consumers viewing this service as a supplement to, and not a substitute for, the expertise, skill, knowledge and judgment of healthcare practitioners. The absence of a warning for a given drug or drug combination in no way should be construed to indicate that the drug or drug combination is safe, effective or appropriate for any given patient. [facility] does not assume any responsibility for any aspect of healthcare administered with the aid of information [facility] provides. The information contained herein is not intended to cover all possible uses, directions, precautions, warnings, drug interactions, allergic reactions, or adverse effects. If you have questions about the drugs you are taking, check with yourdoctor, nurse or pharmacist.  Copyright 9498-4692 [address]. Version: 9.01. Revision date:10/10/2019. Care instructions adapted under license by [facility] ([facility]). If you have questions about a medical condition or this instruction, always ask your healthcare professional. [place] [address] any warranty or liability for your use of this information.

## 2022-07-07 ENCOUNTER — OFFICE VISIT (OUTPATIENT)
Dept: INTERNAL MEDICINE CLINIC | Age: 62
End: 2022-07-07
Payer: COMMERCIAL

## 2022-07-07 VITALS
HEART RATE: 83 BPM | SYSTOLIC BLOOD PRESSURE: 130 MMHG | HEIGHT: 73 IN | BODY MASS INDEX: 41.75 KG/M2 | OXYGEN SATURATION: 98 % | DIASTOLIC BLOOD PRESSURE: 70 MMHG | WEIGHT: 315 LBS

## 2022-07-07 DIAGNOSIS — N52.8 OTHER MALE ERECTILE DYSFUNCTION: ICD-10-CM

## 2022-07-07 DIAGNOSIS — E66.01 CLASS 3 SEVERE OBESITY DUE TO EXCESS CALORIES WITH SERIOUS COMORBIDITY AND BODY MASS INDEX (BMI) OF 45.0 TO 49.9 IN ADULT (HCC): ICD-10-CM

## 2022-07-07 DIAGNOSIS — Z00.00 ANNUAL PHYSICAL EXAM: Primary | ICD-10-CM

## 2022-07-07 DIAGNOSIS — I10 ESSENTIAL HYPERTENSION: ICD-10-CM

## 2022-07-07 PROCEDURE — 99396 PREV VISIT EST AGE 40-64: CPT | Performed by: NURSE PRACTITIONER

## 2022-07-07 RX ORDER — LISINOPRIL 20 MG/1
20 TABLET ORAL DAILY
Qty: 90 TABLET | Refills: 1 | Status: SHIPPED | OUTPATIENT
Start: 2022-07-07

## 2022-07-07 RX ORDER — SILDENAFIL 50 MG/1
50 TABLET, FILM COATED ORAL DAILY PRN
Qty: 30 TABLET | Refills: 2 | Status: SHIPPED | OUTPATIENT
Start: 2022-07-07

## 2022-07-07 ASSESSMENT — ENCOUNTER SYMPTOMS
COUGH: 1
GASTROINTESTINAL NEGATIVE: 1

## 2022-07-07 NOTE — PROGRESS NOTES
SUBJECTIVE:    Patient ID: Sandy Mejía is a 57 y.o. male. CC: Annual exam, hypertension, erectile dysfunction    HPI: The patient presents the office today for annual physical examination and follow-up of chronic medical conditions. He has been lost to follow-up for regular visits since December, 2020. He saw my partner, recently with respiratory illness. It was recommended he obtain a COVID test which he did not. He states he still has some congestion but is improving. He denies any other specific acute concerns today. He has a history of hypertension. He denies any chest pain or shortness of breath. He is compliant with his medication regimen. He has a history of erectile dysfunction. He uses Viagra as needed. He brings up options for weight loss. I discussed obtaining overdue blood work and he can return to discuss weight loss options pending the results of his blood work. He previously reported concerns about penile curvature. He was given a referral to urology but did not want to complete a surgical treatment so he did not return. He declines shingles vaccination.       Past Medical History:   Diagnosis Date    Back injury     2009 injury R upper back from fall    Benign essential hypertension      and LVH on echo    Dysmetabolic syndrome X     GERD (gastroesophageal reflux disease)     HDL lipoprotein deficiency     History of hepatitis C     Previously treated per patient report    Hypercholesterolemia     Impotence     Low testosterone     Lumbar strain     Morbid obesity ([address]     Opiate addiction ([facility] [address] 06/27/2011    2 to 3 percocet daily for four months    Syncope         Current Outpatient Medications   Medication Sig Dispense Refill    sildenafil (VIAGRA) 50 MG tablet Take 1 tablet by mouth daily as needed for Erectile Dysfunction 30 tablet 2    lisinopril (PRINIVIL;ZESTRIL) 20 MG tablet Take 1 tablet by mouth daily 90 tablet 1    albuterol sulfate HFA (VENTOLIN HFA) 108 (90 Base) MCG/ACT inhaler Ventolin HFA 90 mcg/actuation aerosol inhaler   INHALE 2 PUFFS BY MOUTH EVERY 4 HOURS       No current facility-administered medications for this visit. Review of Systems   Constitutional: Negative. Respiratory: Positive for cough. Cardiovascular: Negative. Gastrointestinal: Negative. Genitourinary: Negative. Musculoskeletal: Negative. Neurological: Negative. Psychiatric/Behavioral: Negative. OBJECTIVE:  Physical Exam  Vitals reviewed. Constitutional:       General: He is not in acute distress. Appearance: He is well-developed. He is not diaphoretic. HENT:      Head: Normocephalic and atraumatic. Eyes:      General: No scleral icterus. Conjunctiva/sclera: Conjunctivae normal.   Neck:      Vascular: No JVD. Cardiovascular:      Rate and Rhythm: Normal rate and regular rhythm. Pulmonary:      Effort: Pulmonary effort is normal. No respiratory distress. Breath sounds: Normal breath sounds. No wheezing or rales. Abdominal:      General: There is no distension. Palpations: Abdomen is soft. Tenderness: There is no abdominal tenderness. There is no guarding or rebound. Musculoskeletal:         General: Normal range of motion. Cervical back: Neck supple. Skin:     General: Skin is warm and dry. Neurological:      Mental Status: He is alert and oriented to person, place, and time. Psychiatric:         Behavior: Behavior normal.         Thought Content:  Thought content normal.        /70   Pulse 83   Ht 6' 1\" (1.854 m)   Wt (!) 346 lb (156.9 kg)   SpO2 98%   BMI 45.65 kg/m²      PHQ Scores 6/15/2022 9/2/2020   PHQ2 Score 0 0   PHQ9 Score 0 0     Interpretation of Total Score Depression Severity: 1-4 = Minimal depression, 5-9 = Mild depression, 10-14 = Moderate depression, 15-19 = Moderately severe depression, 20-27 =Severe depression      ASSESSMENT/PLAN:  Betzaida was seen today for annual exam.  Diagnoses and all orders for this visit:    Annual physical exam  -     Comprehensive Metabolic Panel; Future  -     LIPID PANEL; Future  -     PSA Screening; Future  -     POCT Fecal Immunochemical Test (FIT); Future  -     Hemoglobin A1C; Future    Other male erectile dysfunction  -     sildenafil (VIAGRA) 50 MG tablet; Take 1 tablet by mouth daily as needed for Erectile Dysfunction    Essential hypertension  - Normotensive  - he has met JNC standards.  - Continue current regimen. -     lisinopril (PRINIVIL;ZESTRIL) 20 MG tablet; Take 1 tablet by mouth daily    Class 3 severe obesity due to excess calories with serious comorbidity and body mass index (BMI) of 45.0 to 49.9 in [facility])  -Reestablish care. Obtain labs today.  -Discussed a number of weight loss options. Encouraged him to return to the office once his blood work is complete to discuss what options are available for weight loss.         EDNA Hui - CNP

## 2022-07-08 DIAGNOSIS — Z00.00 ANNUAL PHYSICAL EXAM: ICD-10-CM

## 2022-07-08 LAB
A/G RATIO: 1.4 (ref 1.1–2.2)
ALBUMIN SERPL-MCNC: 4.3 G/DL (ref 3.4–5)
ALP BLD-CCNC: 72 U/L (ref 40–129)
ALT SERPL-CCNC: 12 U/L (ref 10–40)
ANION GAP SERPL CALCULATED.3IONS-SCNC: 15 MMOL/L (ref 3–16)
AST SERPL-CCNC: 16 U/L (ref 15–37)
BILIRUB SERPL-MCNC: 0.6 MG/DL (ref 0–1)
BUN BLDV-MCNC: 18 MG/DL (ref 7–20)
CALCIUM SERPL-MCNC: 9.4 MG/DL (ref 8.3–10.6)
CHLORIDE BLD-SCNC: 105 MMOL/L (ref 99–110)
CHOLESTEROL, TOTAL: 211 MG/DL (ref 0–199)
CO2: 22 MMOL/L (ref 21–32)
CREAT SERPL-MCNC: 0.8 MG/DL (ref 0.8–1.3)
GFR AFRICAN AMERICAN: >60
GFR NON-AFRICAN AMERICAN: >60
GLUCOSE BLD-MCNC: 96 MG/DL (ref 70–99)
HDLC SERPL-MCNC: 39 MG/DL (ref 40–60)
LDL CHOLESTEROL CALCULATED: 132 MG/DL
POTASSIUM SERPL-SCNC: 4.5 MMOL/L (ref 3.5–5.1)
PROSTATE SPECIFIC ANTIGEN: 1.17 NG/ML (ref 0–4)
SODIUM BLD-SCNC: 142 MMOL/L (ref 136–145)
TOTAL PROTEIN: 7.3 G/DL (ref 6.4–8.2)
TRIGL SERPL-MCNC: 198 MG/DL (ref 0–150)
VLDLC SERPL CALC-MCNC: 40 MG/DL

## 2022-07-09 LAB
ESTIMATED AVERAGE GLUCOSE: 114 MG/DL
HBA1C MFR BLD: 5.6 %

## 2022-07-14 ENCOUNTER — TELEPHONE (OUTPATIENT)
Dept: INTERNAL MEDICINE CLINIC | Age: 62
End: 2022-07-14

## 2022-07-14 NOTE — TELEPHONE ENCOUNTER
Per result pt. Was called 7/12, no answer. Result note added below      No evidence of diabetes.     Prostate level normal.     Normal electrolytes, kidney, liver function.     Cholesterol is elevated with high total cholesterol, LDL \"bad\" cholesterol, and triglycerides.  Cardiovascular risk score is 16.9%.  Any score over 7.5% and cholesterol treatment is recommended.  I recommend he begin taking a cholesterol medication to lower the risk of heart attack and stroke.  If he is agreeable, I will send this to his pharmacy.        The 10-year ASCVD risk score (Park Orantes, et al., 2013) is: 16.9%    Values used to calculate the score:      Age: 62 years      Sex: Male      Is Non- : No      Diabetic: No      Tobacco smoker: No      Systolic Blood Pressure: 152 mmHg      Is BP treated:  Yes      HDL Cholesterol: 39 mg/dL      Total Cholesterol: 211 mg/dL

## 2022-07-19 ENCOUNTER — OFFICE VISIT (OUTPATIENT)
Dept: INTERNAL MEDICINE CLINIC | Age: 62
End: 2022-07-19
Payer: COMMERCIAL

## 2022-07-19 VITALS
BODY MASS INDEX: 41.75 KG/M2 | SYSTOLIC BLOOD PRESSURE: 130 MMHG | OXYGEN SATURATION: 95 % | HEART RATE: 59 BPM | WEIGHT: 315 LBS | HEIGHT: 73 IN | DIASTOLIC BLOOD PRESSURE: 80 MMHG

## 2022-07-19 DIAGNOSIS — E66.01 CLASS 3 SEVERE OBESITY DUE TO EXCESS CALORIES WITH SERIOUS COMORBIDITY AND BODY MASS INDEX (BMI) OF 45.0 TO 49.9 IN ADULT (HCC): ICD-10-CM

## 2022-07-19 DIAGNOSIS — E78.2 MIXED HYPERLIPIDEMIA: Primary | ICD-10-CM

## 2022-07-19 DIAGNOSIS — I10 ESSENTIAL HYPERTENSION: ICD-10-CM

## 2022-07-19 PROCEDURE — 3017F COLORECTAL CA SCREEN DOC REV: CPT | Performed by: NURSE PRACTITIONER

## 2022-07-19 PROCEDURE — 1036F TOBACCO NON-USER: CPT | Performed by: NURSE PRACTITIONER

## 2022-07-19 PROCEDURE — G8417 CALC BMI ABV UP PARAM F/U: HCPCS | Performed by: NURSE PRACTITIONER

## 2022-07-19 PROCEDURE — 99214 OFFICE O/P EST MOD 30 MIN: CPT | Performed by: NURSE PRACTITIONER

## 2022-07-19 PROCEDURE — G8427 DOCREV CUR MEDS BY ELIG CLIN: HCPCS | Performed by: NURSE PRACTITIONER

## 2022-07-19 RX ORDER — BUPROPION HYDROCHLORIDE 150 MG/1
150 TABLET, EXTENDED RELEASE ORAL 2 TIMES DAILY
Qty: 60 TABLET | Refills: 3 | Status: SHIPPED | OUTPATIENT
Start: 2022-07-19

## 2022-07-19 RX ORDER — ATORVASTATIN CALCIUM 20 MG/1
20 TABLET, FILM COATED ORAL NIGHTLY
Qty: 90 TABLET | Refills: 1 | Status: SHIPPED | OUTPATIENT
Start: 2022-07-19

## 2022-07-19 ASSESSMENT — ENCOUNTER SYMPTOMS
GASTROINTESTINAL NEGATIVE: 1
RESPIRATORY NEGATIVE: 1

## 2022-07-19 NOTE — PROGRESS NOTES
SUBJECTIVE:    Patient ID: Pattie Mcgregor is a 57 y.o. male. CC: Mixed hyperlipidemia, obesity    HPI: Patient returns to the office to discuss lab results and obesity. Patient was recently seen in the office to reestablish care. Blood work was performed at that time that showed mixed hyperlipidemia. Mixed hyperlipidemia: He denies any chest pain or shortness of breath. We discussed elevated ASCVD score and he is amenable to statin treatment. He has a long history of obesity. He has tried to lose weight in the past without success. He is interested in weight loss assistance through medication. He does not want to see a weight loss clinic. He would prefer to avoid injectable medication if possible. Past Medical History:   Diagnosis Date    Back injury     2009 injury R upper back from fall    Benign essential hypertension      and LVH on echo    Dysmetabolic syndrome X     GERD (gastroesophageal reflux disease)     HDL lipoprotein deficiency     History of hepatitis C     Previously treated per patient report    Hypercholesterolemia     Impotence     Low testosterone     Lumbar strain     Morbid obesity ([facility] [address]     Opiate addiction ([facility] [address] 06/27/2011    2 to 3 percocet daily for four months    Syncope         Current Outpatient Medications   Medication Sig Dispense Refill    atorvastatin (LIPITOR) 20 MG tablet Take 1 tablet by mouth at bedtime 90 tablet 1    buPROPion (WELLBUTRIN SR) 150 MG extended release tablet Take 1 tablet by mouth in the morning and 1 tablet before bedtime.  60 tablet 3    sildenafil (VIAGRA) 50 MG tablet Take 1 tablet by mouth daily as needed for Erectile Dysfunction 30 tablet 2    lisinopril (PRINIVIL;ZESTRIL) 20 MG tablet Take 1 tablet by mouth daily 90 tablet 1    albuterol sulfate HFA (PROVENTIL;VENTOLIN;PROAIR) 108 (90 Base) MCG/ACT inhaler Ventolin HFA 90 mcg/actuation aerosol inhaler   INHALE 2 PUFFS BY MOUTH EVERY 4 HOURS       No current facility-administered medications for this visit. Review of Systems   Constitutional: Negative. Respiratory: Negative. Cardiovascular: Negative. Gastrointestinal: Negative. Genitourinary: Negative. Musculoskeletal: Negative. Neurological: Negative. Psychiatric/Behavioral: Negative. OBJECTIVE:  Physical Exam  Vitals reviewed. Constitutional:       General: He is not in acute distress. Appearance: He is well-developed. He is not diaphoretic. HENT:      Head: Normocephalic and atraumatic. Eyes:      General: No scleral icterus. Conjunctiva/sclera: Conjunctivae normal.   Neck:      Vascular: No JVD. Cardiovascular:      Rate and Rhythm: Normal rate and regular rhythm. Pulmonary:      Effort: Pulmonary effort is normal. No respiratory distress. Breath sounds: Normal breath sounds. No wheezing or rales. Abdominal:      General: There is no distension. Palpations: Abdomen is soft. Tenderness: There is no abdominal tenderness. There is no guarding or rebound. Musculoskeletal:         General: Normal range of motion. Cervical back: Neck supple. Skin:     General: Skin is warm and dry. Neurological:      Mental Status: He is alert and oriented to person, place, and time. Psychiatric:         Behavior: Behavior normal.         Thought Content: Thought content normal.      /80   Pulse 59   Ht 6' 1\" (1.854 m)   Wt (!) 349 lb (158.3 kg)   SpO2 95%   BMI 46.04 kg/m²      PHQ Scores 6/15/2022 9/2/2020   PHQ2 Score 0 0   PHQ9 Score 0 0     Interpretation of Total Score Depression Severity: 1-4 = Minimal depression, 5-9 = Mild depression, 10-14 = Moderate depression, 15-19 = Moderately severe depression, 20-27 =Severe depression        ASSESSMENT/PLAN:  Betzaida was seen today for weight loss.   Diagnoses and all orders for this visit:    Mixed hyperlipidemia  - The 10-year ASCVD risk score (Bill Fitzgerald, et al., 2013) is: 16.9%    Values used to calculate the score:      Age: 57 years      Sex: Male      Is Non- : No      Diabetic: No      Tobacco smoker: No      Systolic Blood Pressure: 239 mmHg      Is BP treated: Yes      HDL Cholesterol: 39 mg/dL      Total Cholesterol: 211 mg/dL    - Discussed results and he is amenable to statin treatment to lower cardiovascular risk  -     atorvastatin (LIPITOR) 20 MG tablet; Take 1 tablet by mouth at bedtime    Class 3 severe obesity due to excess calories with serious comorbidity and body mass index (BMI) of 45.0 to 49.9 in adult [facility])  - Failure of past weight loss attempts, weight today 349 LB  - He is agreeable to increasing physical activity and diet. He declines referral to physical therapy or dietitian  - He is interested in medicinal weight loss options but does not want to see the weight loss clinic  - Prefers to start with a medication taken orally rather than injectable  - We discussed treatment options and he would like to try Wellbutrin. -     buPROPion (WELLBUTRIN SR) 150 MG extended release tablet; Take 1 tablet by mouth in the morning and 1 tablet before bedtime. Essential hypertension  - Normotensive  - he has met JNC standards.  - Continue current regimen.           EDNA Alvarado - CNP

## 2022-07-25 DIAGNOSIS — Z00.00 ANNUAL PHYSICAL EXAM: ICD-10-CM

## 2022-07-25 DIAGNOSIS — Z12.11 COLON CANCER SCREENING: Primary | ICD-10-CM

## 2022-07-25 LAB
CONTROL: POSITIVE
HEMOCCULT STL QL: POSITIVE

## 2022-07-25 PROCEDURE — 82274 ASSAY TEST FOR BLOOD FECAL: CPT | Performed by: NURSE PRACTITIONER

## 2022-07-27 DIAGNOSIS — Z12.11 COLON CANCER SCREENING: Primary | ICD-10-CM

## 2023-07-11 ENCOUNTER — TELEPHONE (OUTPATIENT)
Dept: INTERNAL MEDICINE CLINIC | Age: 63
End: 2023-07-11

## 2023-07-11 NOTE — TELEPHONE ENCOUNTER
----- Message from [id number] [facility] [address] sent at 7/11/2023  3:15 PM EDT -----  Subject: Message to Provider    QUESTIONS  Information for Provider? Jose J needs an appt for a wart removal. Left   palm. He also needs his handicap placard updated. Please call and   schedule. Thank you   ---------------------------------------------------------------------------  --------------  Felecia Lyons INFO  [phone]; OK to leave message on voicemail  ---------------------------------------------------------------------------  --------------  SCRIPT ANSWERS  Relationship to Patient? Self  (Is the patient requesting to see the provider for a procedure?)?  Yes

## 2023-07-11 NOTE — TELEPHONE ENCOUNTER
Attempted to contact pt to schedule, VM was full and unable to leave message. If pt returns call please schedule.

## 2023-08-11 ENCOUNTER — OFFICE VISIT (OUTPATIENT)
Dept: INTERNAL MEDICINE CLINIC | Age: 63
End: 2023-08-11
Payer: MEDICARE

## 2023-08-11 VITALS
SYSTOLIC BLOOD PRESSURE: 132 MMHG | BODY MASS INDEX: 41.75 KG/M2 | HEART RATE: 84 BPM | HEIGHT: 73 IN | WEIGHT: 315 LBS | DIASTOLIC BLOOD PRESSURE: 86 MMHG

## 2023-08-11 DIAGNOSIS — L92.9 GRANULATION TISSUE: Primary | ICD-10-CM

## 2023-08-11 PROCEDURE — G8417 CALC BMI ABV UP PARAM F/U: HCPCS | Performed by: NURSE PRACTITIONER

## 2023-08-11 PROCEDURE — 3075F SYST BP GE 130 - 139MM HG: CPT | Performed by: NURSE PRACTITIONER

## 2023-08-11 PROCEDURE — 1036F TOBACCO NON-USER: CPT | Performed by: NURSE PRACTITIONER

## 2023-08-11 PROCEDURE — 3079F DIAST BP 80-89 MM HG: CPT | Performed by: NURSE PRACTITIONER

## 2023-08-11 PROCEDURE — 3017F COLORECTAL CA SCREEN DOC REV: CPT | Performed by: NURSE PRACTITIONER

## 2023-08-11 PROCEDURE — 1123F ACP DISCUSS/DSCN MKR DOCD: CPT | Performed by: NURSE PRACTITIONER

## 2023-08-11 PROCEDURE — 99213 OFFICE O/P EST LOW 20 MIN: CPT | Performed by: NURSE PRACTITIONER

## 2023-08-11 PROCEDURE — G8427 DOCREV CUR MEDS BY ELIG CLIN: HCPCS | Performed by: NURSE PRACTITIONER

## 2023-08-11 SDOH — ECONOMIC STABILITY: INCOME INSECURITY: HOW HARD IS IT FOR YOU TO PAY FOR THE VERY BASICS LIKE FOOD, HOUSING, MEDICAL CARE, AND HEATING?: NOT HARD AT ALL

## 2023-08-11 SDOH — ECONOMIC STABILITY: FOOD INSECURITY: WITHIN THE PAST 12 MONTHS, THE FOOD YOU BOUGHT JUST DIDN'T LAST AND YOU DIDN'T HAVE MONEY TO GET MORE.: NEVER TRUE

## 2023-08-11 SDOH — ECONOMIC STABILITY: FOOD INSECURITY: WITHIN THE PAST 12 MONTHS, YOU WORRIED THAT YOUR FOOD WOULD RUN OUT BEFORE YOU GOT MONEY TO BUY MORE.: NEVER TRUE

## 2023-08-11 SDOH — ECONOMIC STABILITY: HOUSING INSECURITY
IN THE LAST 12 MONTHS, WAS THERE A TIME WHEN YOU DID NOT HAVE A STEADY PLACE TO SLEEP OR SLEPT IN A SHELTER (INCLUDING NOW)?: NO

## 2023-08-11 ASSESSMENT — PATIENT HEALTH QUESTIONNAIRE - PHQ9
SUM OF ALL RESPONSES TO PHQ QUESTIONS 1-9: 0
2. FEELING DOWN, DEPRESSED OR HOPELESS: 0
SUM OF ALL RESPONSES TO PHQ9 QUESTIONS 1 & 2: 0
SUM OF ALL RESPONSES TO PHQ QUESTIONS 1-9: 0
SUM OF ALL RESPONSES TO PHQ QUESTIONS 1-9: 0
1. LITTLE INTEREST OR PLEASURE IN DOING THINGS: 0
SUM OF ALL RESPONSES TO PHQ QUESTIONS 1-9: 0

## 2023-08-11 NOTE — PROGRESS NOTES
SUBJECTIVE:    Patient ID: Batsheva Velasco is a 70 y.o. male. CC: Hand \"wart\"    HPI: The patient presents to the office today for follow-up of an emergency department visit and concerns about his left hand. He has been lost to follow-up for about 1 year. On 7/31/2023, the patient presented to [facility] with complaint of a bleeding wart on his left hand. Patient reports a palmar wart which has been present for weeks which she had treated with Compound W and hydrogen peroxide. The wart fell off but then began bleeding which he could not get to stop. Patient was treated with lidocaine with epinephrine and silver nitrate application. He was discharged home. Today, the patient reports growth on his hand which continues to bleed easily. It is not currently bleeding. No fevers or chills. Past Medical History:   Diagnosis Date    Back injury     2009 injury R upper back from fall    Benign essential hypertension      and LVH on echo    Dysmetabolic syndrome X     GERD (gastroesophageal reflux disease)     HDL lipoprotein deficiency     History of hepatitis C     Previously treated per patient report    Hypercholesterolemia     Impotence     Low testosterone     Lumbar strain     Morbid obesity ([address])     Opiate addiction ([address]) 06/27/2011    2 to 3 percocet daily for four months    Syncope         Current Outpatient Medications   Medication Sig Dispense Refill    atorvastatin (LIPITOR) 20 MG tablet Take 1 tablet by mouth at bedtime 90 tablet 1    buPROPion (WELLBUTRIN SR) 150 MG extended release tablet Take 1 tablet by mouth in the morning and 1 tablet before bedtime.  60 tablet 3    sildenafil (VIAGRA) 50 MG tablet Take 1 tablet by mouth daily as needed for Erectile Dysfunction 30 tablet 2    lisinopril (PRINIVIL;ZESTRIL) 20 MG tablet Take 1 tablet by mouth daily 90 tablet 1    albuterol sulfate HFA (PROVENTIL;VENTOLIN;PROAIR) 108 (90 Base) MCG/ACT inhaler Ventolin HFA 90 mcg/actuation

## 2023-08-16 ENCOUNTER — TELEPHONE (OUTPATIENT)
Dept: ORTHOPEDIC SURGERY | Age: 63
End: 2023-08-16

## 2023-08-21 ENCOUNTER — TELEPHONE (OUTPATIENT)
Dept: ORTHOPEDIC SURGERY | Age: 63
End: 2023-08-21

## 2023-10-11 ENCOUNTER — OFFICE VISIT (OUTPATIENT)
Dept: ORTHOPEDIC SURGERY | Age: 63
End: 2023-10-11
Payer: MEDICARE

## 2023-10-11 ENCOUNTER — TELEPHONE (OUTPATIENT)
Dept: ORTHOPEDIC SURGERY | Age: 63
End: 2023-10-11

## 2023-10-11 VITALS — WEIGHT: 315 LBS | BODY MASS INDEX: 41.75 KG/M2 | HEIGHT: 73 IN

## 2023-10-11 DIAGNOSIS — L98.0 PYOGENIC GRANULOMA: Primary | ICD-10-CM

## 2023-10-11 PROCEDURE — 3017F COLORECTAL CA SCREEN DOC REV: CPT | Performed by: PHYSICIAN ASSISTANT

## 2023-10-11 PROCEDURE — G8427 DOCREV CUR MEDS BY ELIG CLIN: HCPCS | Performed by: PHYSICIAN ASSISTANT

## 2023-10-11 PROCEDURE — 99204 OFFICE O/P NEW MOD 45 MIN: CPT | Performed by: PHYSICIAN ASSISTANT

## 2023-10-11 PROCEDURE — G8484 FLU IMMUNIZE NO ADMIN: HCPCS | Performed by: PHYSICIAN ASSISTANT

## 2023-10-11 PROCEDURE — 1036F TOBACCO NON-USER: CPT | Performed by: PHYSICIAN ASSISTANT

## 2023-10-11 PROCEDURE — 1123F ACP DISCUSS/DSCN MKR DOCD: CPT | Performed by: PHYSICIAN ASSISTANT

## 2023-10-11 PROCEDURE — G8417 CALC BMI ABV UP PARAM F/U: HCPCS | Performed by: PHYSICIAN ASSISTANT

## 2023-10-11 NOTE — PROGRESS NOTES
Mr. Trace Shelley is a 70 y.o. right handed man  who is seen today in Hand Surgical Consultation at the request of EDNA Ramachandran CNP. He presents today regarding left Ring Finger symptoms which have been present for approximately 3 months. A history of antecedent trauma or injury is Absent. He reports a mass on the Volar aspect of the Ring Finger with symptoms that include No Symptoms. Finger symptoms are exacerbated with Gripping. The size of the mass has been increasing with time and change in activity level. He states that the mass has fallen off in the past and he had to go to the Emergency department due to it continuously bleeding. Previous treatment has included conservative measures. He does not claim relation of his symptoms to his required work activities. He has not undergone any form of testing. I have today reviewed with Trace Shelley the clinically relevant, past medical history, medications, allergies,  family history, social history, and Review Of Systems & I have documented any details relevant to today's presenting complaints in my history above. Mr. Cisco Bhatia self-reported past medical history, medications, allergies,  family history, social history, and Review Of Systems have been scanned into the chart under the \"Media\" tab. Physical Exam:  Mr. Cisco Bhatia most recent vitals:  Vitals  Height: 6' 1\" (185.4 cm)  Weight - Scale: (!) 334 lb (151.5 kg)    He is well nourished, oriented to person, place & time. He demonstrates appropriate mood and affect as well as normal gait and station. Skin: Normal in appearance, Normal Color, and Normal Texture Bilaterally   Digital range of motion is Full bilaterally. Wrist range of motion is without significant limitation bilaterally. There is no evidence of gross joint instability bilaterally.   Sensation is normal in the Whole Hand bilaterally  Muscular strength is clinically appropriate

## 2024-06-06 ENCOUNTER — TELEPHONE (OUTPATIENT)
Dept: INTERNAL MEDICINE CLINIC | Age: 64
End: 2024-06-06